# Patient Record
Sex: MALE | Race: WHITE | NOT HISPANIC OR LATINO | Employment: OTHER | URBAN - METROPOLITAN AREA
[De-identification: names, ages, dates, MRNs, and addresses within clinical notes are randomized per-mention and may not be internally consistent; named-entity substitution may affect disease eponyms.]

---

## 2019-05-09 ENCOUNTER — TELEPHONE (OUTPATIENT)
Dept: ORTHOPEDICS | Facility: CLINIC | Age: 64
End: 2019-05-09

## 2019-05-09 NOTE — TELEPHONE ENCOUNTER
Pt called, received referral from NP Cate Banegas from [facility] for pt to see Dr. Mario for left knee pain. Appt made for Monday 5/27/19 @ 1030 @ [place]. Pt verbalizes understanding of person, place & time

## 2019-05-16 DIAGNOSIS — M25.562 LEFT KNEE PAIN, UNSPECIFIED CHRONICITY: Primary | ICD-10-CM

## 2019-05-27 ENCOUNTER — OFFICE VISIT (OUTPATIENT)
Dept: ORTHOPEDICS | Facility: CLINIC | Age: 64
End: 2019-05-27
Payer: MEDICARE

## 2019-05-27 ENCOUNTER — HOSPITAL ENCOUNTER (OUTPATIENT)
Dept: RADIOLOGY | Facility: HOSPITAL | Age: 64
Discharge: HOME | End: 2019-05-27
Attending: ORTHOPAEDIC SURGERY
Payer: MEDICARE

## 2019-05-27 VITALS
BODY MASS INDEX: 23.1 KG/M2 | WEIGHT: 180 LBS | HEART RATE: 59 BPM | SYSTOLIC BLOOD PRESSURE: 111 MMHG | HEIGHT: 74 IN | DIASTOLIC BLOOD PRESSURE: 58 MMHG

## 2019-05-27 DIAGNOSIS — M71.22 BAKER CYST, LEFT: ICD-10-CM

## 2019-05-27 DIAGNOSIS — M25.562 LEFT KNEE PAIN, UNSPECIFIED CHRONICITY: ICD-10-CM

## 2019-05-27 DIAGNOSIS — M17.12 PRIMARY OSTEOARTHRITIS OF LEFT KNEE: Primary | ICD-10-CM

## 2019-05-27 PROCEDURE — 99999 PR PBB SHADOW E&M-EST. PATIENT-LVL III: CPT | Mod: PBBFAC,,, | Performed by: ORTHOPAEDIC SURGERY

## 2019-05-27 PROCEDURE — 73562 XR KNEE 3 VIEW LEFT: ICD-10-PCS | Mod: 26,LT,, | Performed by: RADIOLOGY

## 2019-05-27 PROCEDURE — 20610 DRAIN/INJ JOINT/BURSA W/O US: CPT | Mod: PBBFAC | Performed by: ORTHOPAEDIC SURGERY

## 2019-05-27 PROCEDURE — 99213 OFFICE O/P EST LOW 20 MIN: CPT | Mod: PBBFAC,25 | Performed by: ORTHOPAEDIC SURGERY

## 2019-05-27 PROCEDURE — 73562 X-RAY EXAM OF KNEE 3: CPT | Mod: TC,FY,LT

## 2019-05-27 PROCEDURE — 99203 OFFICE O/P NEW LOW 30 MIN: CPT | Mod: 25,S$PBB,, | Performed by: ORTHOPAEDIC SURGERY

## 2019-05-27 PROCEDURE — 20610 LARGE JOINT ASPIRATION/INJECTION: L KNEE: ICD-10-PCS | Mod: S$PBB,LT,, | Performed by: ORTHOPAEDIC SURGERY

## 2019-05-27 PROCEDURE — 73562 X-RAY EXAM OF KNEE 3: CPT | Mod: 26,LT,, | Performed by: RADIOLOGY

## 2019-05-27 PROCEDURE — 99999 PR PBB SHADOW E&M-EST. PATIENT-LVL III: ICD-10-PCS | Mod: PBBFAC,,, | Performed by: ORTHOPAEDIC SURGERY

## 2019-05-27 PROCEDURE — 99203 PR OFFICE/OUTPT VISIT, NEW, LEVL III, 30-44 MIN: ICD-10-PCS | Mod: 25,S$PBB,, | Performed by: ORTHOPAEDIC SURGERY

## 2019-05-27 RX ORDER — TRIAMCINOLONE ACETONIDE 40 MG/ML
80 INJECTION, SUSPENSION INTRA-ARTICULAR; INTRAMUSCULAR
Status: DISCONTINUED | OUTPATIENT
Start: 2019-05-27 | End: 2019-05-27 | Stop reason: HOSPADM

## 2019-05-27 RX ORDER — CYCLOBENZAPRINE HCL 10 MG
TABLET ORAL
COMMUNITY
Start: 2018-07-23

## 2019-05-27 RX ADMIN — TRIAMCINOLONE ACETONIDE 80 MG: 40 INJECTION, SUSPENSION INTRA-ARTICULAR; INTRAMUSCULAR at 01:05

## 2019-05-27 NOTE — PROGRESS NOTES
Subjective:      Patient ID: Cordell Taylor is a 63 y.o. male.    Chief Complaint: Pain and Injury of the Left Knee    Referring Provider: Cate Banegas, Gabriel  [address] [facility]  [place] [address]    HPI:  Mr. Taylor is a 63-year-old male who presented today for evaluation of approximately 2 months of left knee swelling with minimal pain. He stated his symptoms began when he was working on his boat having a needle frequently.  Standing for extended periods increases his symptoms while nothing seems to improve them. He has swelling but denies giving way or locking.  He has not taken NSAIDs, worn a brace, done physical therapy, nor had injections.    Past medical history:  Depression  Headaches    Past surgical history:  T&A  Vasectomy    Review of patient's allergies indicates:  No Known Allergies    Social History     Occupational History    Sales     Tobacco Use    Smoking status: Never Smoker    Smokeless tobacco: Never Used   Substance and Sexual Activity    Alcohol use: Yes     Frequency: 2-3 times a week    Drug use: Never    Sexual activity: Not on file      Family history:  Father:  , Alzheimer's.  Mother:  , Parkinson's.  Brother:  2, alive, Parkinson's disease.  Sister:  1, alive, thyroid cancer.  Son:  2, both , motorcycle MVA.    Previous Hospitalizations:      ROS:   Review of Systems   Constitution: Negative for chills and fever.   HENT: Negative for congestion.    Eyes: Negative for blurred vision.   Cardiovascular: Negative for chest pain.   Respiratory: Negative for cough.    Endocrine: Negative for polydipsia.   Hematologic/Lymphatic: Negative for adenopathy.   Skin: Negative for itching and skin cancer.   Musculoskeletal: Positive for joint swelling. Negative for gout and joint pain.   Gastrointestinal: Negative for constipation, diarrhea and heartburn.   Genitourinary: Negative for nocturia.   Neurological: Positive for headaches.  Negative for seizures.   Psychiatric/Behavioral: Positive for depression. Negative for substance abuse. The patient is not nervous/anxious.    Allergic/Immunologic: Negative for environmental allergies.           Objective:      Physical Exam:   General: AAOx3.  No acute distress  HEENT: Normocephalic, PEARLA EOMI, Good Dentition  Neck: Supple, No JVD  Chest: Symetric, equal excursion on inspiration  Abdomen: Soft NTND  Vascular:  Pulses intact and equal bilaterally.  Capillary refill less than 3 seconds and equal bilaterally  Neurologic:  Pinprick and soft touch intact and equal bilaterally  Integment:  No ecchymosis, no errythema  Extremity:  Knee:  Extension/flexion equal bilaterally 0/130 degrees. Mild effusion left knee.  Crepitus with motion both knees.  Baker cyst left knee. Negative patellar load/compression both knees.  Negative patella apprehension/relocation both knees.  Varus/valgus stressing equal bilaterally with endpoint.  Lachman's/drawer equal bilaterally with good endpoint.  Minimal joint line tenderness left knee.  Theresa negative both knees.  Coffee relatively negative both knees.  Nontender at the anserine insertion both knees.  No swelling at the anserine insertion both knees.  Radiography:  Personally reviewed x-rays of the left knee completed on 0 5/27/2019 showed mild arthritic changes no fracture dislocation.      Assessment:       Impression:      1. Primary osteoarthritis of left knee    2. Baker cyst, left          Plan:       1.  Discussed physical examination and radiographic findings with the patient. Cordell understands that he has mild arthritis of his left knee and with conservative management he should improve.  2.  Offered a steroid injection to the left knee, he elected to proceed.  3.  Discussed brace wear with the patient the patient should purchase a neoprene sleeve brace and wear when he is working and standing.  4.  Home exercises to include quadriceps and hamstring  strengthening were shown discussed.  5.  Discussed possible referral to physical therapy declined for now.  6.  Take NSAIDs as tolerated allowed by PCM.  7.  [facility] portal was discussed with the patient and information was given.  The patient was encouraged to use the portal for future encounters.  8.  Follow up p.r.n.

## 2019-05-27 NOTE — LETTER
May 27, 2019      Cate Banegas, JOHNNIE  [address] [facility]  [place] [address]           [facility]  [address] [place] [address]  Phone: [phone]  Fax: [phone]          Patient: Cordell Taylor   MR Number: [id number]   YOB: 1955   Date of Visit: 5/27/2019       Dear Cate Banegas:    Thank you for referring Cordell Taylor to me for evaluation. Attached you will find relevant portions of my assessment and plan of care.    If you have questions, please do not hesitate to call me. I look forward to following Cordell Taylor along with you.    Sincerely,    Andres Mario, DO    Enclosure  CC:  No Recipients    If you would like to receive this communication electronically, please contact [email or web address] or [phone] to request more information on [facility] access.    For providers and/or their staff who would like to refer a patient to [facility], please contact us through our one-stop-shop provider referral line, [facility], at [phone].    If you feel you have received this communication in error or would no longer like to receive these types of communications, please e-mail [email or web address]          No complaints

## 2019-05-27 NOTE — PROCEDURES
Large Joint Aspiration/Injection: L knee  Date/Time: 5/27/2019 1:13 PM  Performed by: Andres Mario DO  Authorized by: Andres Mario, DO     Consent Done?:  Yes (Verbal)  Indications:  Pain, joint swelling and diagnostic evaluation  Procedure site marked: Yes    Timeout: Prior to procedure the correct patient, procedure, and site was verified      Location:  Knee  Site:  L knee  Prep: Patient was prepped and draped in usual sterile fashion    Ultrasonic Guidance for needle placement: No  Needle size:  22 G  Approach:  Anterolateral  Medications:  80 mg triamcinolone acetonide 40 mg/mL  Patient tolerance:  Patient tolerated the procedure well with no immediate complications